# Patient Record
Sex: FEMALE | Race: BLACK OR AFRICAN AMERICAN
[De-identification: names, ages, dates, MRNs, and addresses within clinical notes are randomized per-mention and may not be internally consistent; named-entity substitution may affect disease eponyms.]

---

## 2017-12-01 ENCOUNTER — HOSPITAL ENCOUNTER (EMERGENCY)
Dept: HOSPITAL 74 - JERFT | Age: 52
Discharge: HOME | End: 2017-12-01
Payer: COMMERCIAL

## 2017-12-01 VITALS — SYSTOLIC BLOOD PRESSURE: 152 MMHG | HEART RATE: 61 BPM | TEMPERATURE: 98.3 F | DIASTOLIC BLOOD PRESSURE: 69 MMHG

## 2017-12-01 VITALS — BODY MASS INDEX: 21.6 KG/M2

## 2017-12-01 DIAGNOSIS — F17.210: ICD-10-CM
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
## 2017-12-01 DIAGNOSIS — S99.922A: Primary | ICD-10-CM

## 2017-12-01 NOTE — PDOC
History of Present Illness





- General


Chief Complaint: Injury


Stated Complaint: LT FOOT PAIN


Time Seen by Provider: 12/01/17 10:42


History Source: Patient


Exam Limitations: No Limitations





- History of Present Illness


Initial Comments: 





12/01/17 11:10


CHIEF COMPLAINT: Pain to dorsum of left foot





HISTORY OF PRESENT ILLNESS: Patient is a 52-year-old female, no significant 

medical history currently on no medication presents for left foot pain. Patient 

reports 2 weeks ago she dropped a small safe on her foot since with pain. There 

is no erythema, edema or deformity.








Occurred: reports: other (2 weeks ago)


Severity: Yes: mild


Lower Extremity Pain Location: left: foot





Lower Ext. Injury Location





- Specific Injury Location


Foot: left foot pain





Extremity Pain Location





- Extremity Pain Location


Extremity Pain Locations: left: foot





Past History





- Past Medical History


Allergies/Adverse Reactions: 


 Allergies











Allergy/AdvReac Type Severity Reaction Status Date / Time


 


No Known Allergies Allergy   Verified 12/01/17 10:20











Home Medications: 


Ambulatory Orders





Ibuprofen [Motrin -] 600 mg PO QID #28 tablet 12/01/17 








COPD: No


Other medical history: DENIES.





- Suicide/Smoking/Psychosocial Hx


Smoking History: Current every day smoker


Have you smoked in the past 12 months: Yes


Number of Cigarettes Smoked Daily: 10


Information on smoking cessation initiated: No


Hx Alcohol Use: No





**Review of Systems





- Review of Systems


Constitutional: No: Symptoms Reported


Respiratory: No: Symptoms reported


Cardiac (ROS): No: Symptoms Reported


ABD/GI: No: Symptoms Reported


Musculoskeletal: No: Joint Pain, Joint Swelling, Muscle Pain, Muscle Weakness, 

Joint Stiffness


Integumentary: No: Symptoms Reported, Bruising, Erythema


Neurological: No: Symptoms reported, Paresthesia, Tingling, Tremors


All Other Systems: Reviewed and Negative





*Physical Exam





- Vital Signs


 Last Vital Signs











Temp Pulse Resp BP Pulse Ox


 


 98.3 F   61   19   152/69   98 


 


 12/01/17 10:21  12/01/17 10:21  12/01/17 10:21  12/01/17 10:21  12/01/17 10:21














- Physical Exam


General Appearance: Yes: Appropriately Dressed.  No: Apparent Distress


Neck: negative: Tender lateral, Tender midline


Respiratory/Chest: positive: Lungs Clear, Normal Breath Sounds.  negative: 

Respiratory Distress, Accessory Muscle Use


Cardiovascular: positive: Regular Rhythm, Regular Rate


Musculoskeletal: positive: Normal Inspection.  negative: Decreased Range of 

Motion, Muscle Spasm, Vertebral Tenderness


Extremity: positive: Normal Capillary Refill, Normal Inspection, Normal Range 

of Motion, Tender (to dorsum of the left foot.  ).  negative: Pedal Edema, 

Swelling, Erythema, Inflammation


Integumentary: positive: Normal Color, Dry.  negative: Erythema, Swelling, 

Ecchymosis, Bruising


Neurologic: positive: Alert, Normal Mood/Affect, Normal Response, Motor 

Strength 5/5





ED Treatment Course





- RADIOLOGY


Radiology Studies Ordered: 














 Category Date Time Status


 


 ANKLE & FOOT-LEFT* [RAD] Stat Radiology  12/01/17 10:41 Completed














Medical Decision Making





- Medical Decision Making





12/01/17 11:27


A/P: Patient with injury to foot 2 weeks ago still with pain x-ray demonstrated 

no acute fracture dislocation I have placed patient in Ace wrap, will DC 

patient on Motrin supportive shoes, follow-up with orthopedics if pain persists





I discussed the physical exam findings, ancillary test results and final 

diagnoses with the patient. I answered all of the patient's questions.


The patient was satisfied with the care received and felt comfortable with the 

discharge plan and treatment plan. 


The patient will call  to arrange follow-up and will return to the Emergency 

Department with any new, persistent or worsening symptoms. 





*DC/Admit/Observation/Transfer


Diagnosis at time of Disposition: 


Foot injury


Qualifiers:


 Encounter type: initial encounter Laterality: left Qualified Code(s): S99.922A 

- Unspecified injury of left foot, initial encounter








- Discharge Dispostion


Disposition: HOME


Condition at time of disposition: Good


Admit: No





- Prescriptions


Prescriptions: 


Ibuprofen [Motrin -] 600 mg PO QID #28 tablet





- Referrals


Referrals: 


Brant Oreilly MD [Staff Physician] - 





- Patient Instructions


Additional Instructions: 


Ic e to foot


Motrin for pain


Well fitting shoes.


Follow up with ortho if pain persists.  





- Post Discharge Activity

## 2018-03-27 ENCOUNTER — HOSPITAL ENCOUNTER (EMERGENCY)
Dept: HOSPITAL 74 - JER | Age: 53
Discharge: HOME | End: 2018-03-27
Payer: COMMERCIAL

## 2018-03-27 VITALS — TEMPERATURE: 98.3 F

## 2018-03-27 VITALS — HEART RATE: 72 BPM | SYSTOLIC BLOOD PRESSURE: 118 MMHG | DIASTOLIC BLOOD PRESSURE: 75 MMHG

## 2018-03-27 VITALS — BODY MASS INDEX: 21.4 KG/M2

## 2018-03-27 DIAGNOSIS — F17.210: ICD-10-CM

## 2018-03-27 DIAGNOSIS — F41.9: ICD-10-CM

## 2018-03-27 DIAGNOSIS — R07.89: Primary | ICD-10-CM

## 2018-03-27 LAB
ALBUMIN SERPL-MCNC: 4 G/DL (ref 3.4–5)
ALP SERPL-CCNC: 79 U/L (ref 45–117)
ALT SERPL-CCNC: 20 U/L (ref 12–78)
ANION GAP SERPL CALC-SCNC: 8 MMOL/L (ref 8–16)
APPEARANCE UR: (no result)
AST SERPL-CCNC: 22 U/L (ref 15–37)
BASOPHILS # BLD: 0.8 % (ref 0–2)
BILIRUB SERPL-MCNC: 0.5 MG/DL (ref 0.2–1)
BILIRUB UR STRIP.AUTO-MCNC: NEGATIVE MG/DL
BUN SERPL-MCNC: 15 MG/DL (ref 7–18)
CALCIUM SERPL-MCNC: 9.1 MG/DL (ref 8.5–10.1)
CHLORIDE SERPL-SCNC: 104 MMOL/L (ref 98–107)
CO2 SERPL-SCNC: 30 MMOL/L (ref 21–32)
COLOR UR: (no result)
CREAT SERPL-MCNC: 0.7 MG/DL (ref 0.55–1.02)
DEPRECATED RDW RBC AUTO: 12.9 % (ref 11.6–15.6)
EOSINOPHIL # BLD: 0.2 % (ref 0–4.5)
GLUCOSE SERPL-MCNC: 87 MG/DL (ref 74–106)
HCT VFR BLD CALC: 39.8 % (ref 32.4–45.2)
HGB BLD-MCNC: 13.3 GM/DL (ref 10.7–15.3)
INR BLD: 1.12 (ref 0.82–1.09)
KETONES UR QL STRIP: (no result)
LEUKOCYTE ESTERASE UR QL STRIP.AUTO: NEGATIVE
LYMPHOCYTES # BLD: 40.9 % (ref 8–40)
MAGNESIUM SERPL-MCNC: 2.3 MG/DL (ref 1.8–2.4)
MCH RBC QN AUTO: 30.6 PG (ref 25.7–33.7)
MCHC RBC AUTO-ENTMCNC: 33.4 G/DL (ref 32–36)
MCV RBC: 91.4 FL (ref 80–96)
MONOCYTES # BLD AUTO: 11.7 % (ref 3.8–10.2)
NEUTROPHILS # BLD: 46.4 % (ref 42.8–82.8)
NITRITE UR QL STRIP: NEGATIVE
PH UR: 9 [PH] (ref 5–8)
PLATELET # BLD AUTO: 220 K/MM3 (ref 134–434)
PMV BLD: 8.4 FL (ref 7.5–11.1)
POTASSIUM SERPLBLD-SCNC: 4 MMOL/L (ref 3.5–5.1)
PROT SERPL-MCNC: 7.4 G/DL (ref 6.4–8.2)
PROT UR QL STRIP: NEGATIVE
PROT UR QL STRIP: NEGATIVE
PT PNL PPP: 12.7 SEC (ref 9.98–11.88)
RBC # BLD AUTO: 4.35 M/MM3 (ref 3.6–5.2)
RBC # UR STRIP: NEGATIVE /UL
SODIUM SERPL-SCNC: 142 MMOL/L (ref 136–145)
SP GR UR: 1.01 (ref 1–1.03)
UROBILINOGEN UR STRIP-MCNC: NEGATIVE MG/DL (ref 0.2–1)
WBC # BLD AUTO: 6.7 K/MM3 (ref 4–10)

## 2018-03-27 NOTE — PDOC
History of Present Illness





- General


Chief Complaint: Lightheaded


Stated Complaint: DIZZINESS,HIP PAIN


Time Seen by Provider: 03/27/18 12:08


History Source: Patient


Exam Limitations: No Limitations





- History of Present Illness


Initial Comments: 





03/27/18 13:01


Patient is a 53F with history of tobacco abuse here today complaining of chest 

pain with associated dizziness that started last night. She states that her 

chest pain has resolved now, but her chest pain was worsened with exertion, 

movement of her arms, and palpation. It was relieved with rest. Patient states 

she's had bilateral numbness and weakness in her arms and legs, but she has 

been able to walk. She endorses subjective fevers, chills, nausea. Denies 

vomiting. Denies leg swelling, history of blood clots. Patient complains of 

pain in multiple joints, including her hips legs, shoulders and back. Denies 

trauma.





Past History





- Past Medical History


Allergies/Adverse Reactions: 


 Allergies











Allergy/AdvReac Type Severity Reaction Status Date / Time


 


No Known Allergies Allergy   Verified 03/27/18 12:03











Home Medications: 


Ambulatory Orders





NK [No Known Home Medication]  03/27/18 








COPD: No


Other medical history: DENIES.





- Suicide/Smoking/Psychosocial Hx


Smoking History: Current every day smoker


Have you smoked in the past 12 months: Yes


Number of Cigarettes Smoked Daily: 10


Information on smoking cessation initiated: No


Hx Alcohol Use: No





**Review of Systems





- Review of Systems


Comments:: 





03/27/18 13:06


GENERAL/CONSTITUTIONAL: No fever or chills. No weakness.


HEAD, EYES, EARS, NOSE AND THROAT: No change in vision. No sore throat.


CARDIOVASCULAR: Positive for chest pain and shortness of breath


RESPIRATORY:  Positive for cough. Negative for wheezing, or hemoptysis.


GASTROINTESTINAL: Positive for nausea. Negative for vomiting, diarrhea or 

constipation.


GENITOURINARY: No dysuria, frequency, or change in urination.


MUSCULOSKELETAL: Positive for diffuse joint or muscle swelling or pain. 

Positive for lower back pain.


SKIN: No rash


NEUROLOGIC: No headache, vertigo, loss of consciousness, or change in strength/

sensation.


ENDOCRINE: No increased thirst. No abnormal weight change


HEMATOLOGIC/LYMPHATIC: No anemia, easy bleeding, or history of blood clots.


ALLERGIC/IMMUNOLOGIC: No hives or skin allergy.








*Physical Exam





- Vital Signs


 Last Vital Signs











Temp Pulse Resp BP Pulse Ox


 


 98.3 F   84   20   149/79   99 


 


 03/27/18 11:29  03/27/18 11:29  03/27/18 11:29  03/27/18 11:29  03/27/18 12:00














- Physical Exam


Comments: 





03/27/18 13:10


GENERAL: Awake, alert, and fully oriented, in no acute distress


HEAD: No signs of trauma, normocephalic, atraumatic


EYES: PERRLA, EOMI, sclera anicteric, conjunctiva clear


ENT: Auricles normal inspection, hearing grossly normal, nares patent, 

oropharynx clear without


exudates. Moist mucosa


NECK: Normal ROM, supple, no lymphadenopathy, JVD, or masses


LUNGS: No distress, speaks full sentences, clear to auscultation bilaterally


HEART: Regular rate and rhythm, normal S1 and S2, no murmurs, rubs or gallops, 

peripheral pulses normal and equal bilaterally.


EXTREMITIES: Normal inspection, Normal range of motion, no edema.  No clubbing 

or cyanosis.


NEUROLOGICAL: Cranial nerves II through XII grossly intact.  Normal speech, 

normal gait, no focal sensorimotor deficits


SKIN: Warm, Dry, normal turgor, no rashes or lesions noted.








**Heart Score/ECG Review





- History


History: Slightly suspicious





- Electrocardiogram


EKG: Normal





- Age


Age: 45-65





- Risk Factors


Risk Factors Heart Score: Yes Smoking History


Based on the list above the patient has:: 1-2 risk factors





- Troponin


Troponin: </= normal limit





- Score


Heart Score - Total: 2





ED Treatment Course





- LABORATORY


CBC & Chemistry Diagram: 


 03/27/18 13:25





 03/27/18 13:25





- RADIOLOGY


Radiology Studies Ordered: 














 Category Date Time Status


 


 CHEST PA & LAT [RAD] Stat Radiology  03/27/18 12:34 Ordered














Medical Decision Making





- Medical Decision Making





03/27/18 13:10


Patient is 53F with history of tobacco abuse here today complaining of chest 

pain. Vital signs stable and normal. Chest pain started last night, now 

resolved. Story is atypical. Will evaluate with cbc, cmp, trop, ekg, pt/inr, cxr

, ua. 





EKG shows normal sinus rhythm with normal rate. No st elevations/depressions. 

No significant t wave abnormalities. Normal QRS/WA/QTc intervals.





03/27/18 14:37


 Laboratory Tests











  03/27/18 03/27/18 03/27/18





  13:25 13:25 13:25


 


WBC  6.7  D  


 


Hgb  13.3  


 


Hct  39.8  


 


Plt Count  220  D  


 


INR   1.12 


 


BUN    15


 


Creatinine    0.7


 


Creat Clearance w eGFR    > 60


 


Troponin I    < 0.02








CBC normal, CMP reassuring. INR normal. Troponin undetectable. 


03/27/18 14:56


UA negative. Will discharge with return precautions and PCP follow up.


03/27/18 15:04


Patient now complaining of bilateral leg numbess from hip to knee, but 

ambulatory without pain and has some sensation. Asking for work note. 

Discharged with return precautions.





*DC/Admit/Observation/Transfer


Diagnosis at time of Disposition: 


 Atypical chest pain








- Discharge Dispostion


Disposition: HOME


Condition at time of disposition: Good


Admit: No





- Referrals





- Patient Instructions


Printed Discharge Instructions:  DI for Atypical Chest Pain


Additional Instructions: 


Please return if you have any new, worsening or concerning symptoms.





Please follow up with your primary care physician this week.





- Post Discharge Activity


Forms/Work/School Notes:  Back to Work

## 2018-03-27 NOTE — PDOC
History of Present Illness





- General


History Source: Patient


Exam Limitations: No Limitations





- History of Present Illness


Initial Comments: 





03/27/18 13:32


The patient is a 53 year old female, with a significant past medical history of 

anxiety, who presents to the emergency department with lightheadedness, chest 

pain, dizziness that started last night. She reports her chest pain has mostly 

resolved by ER arrival. She reports her chest pain last night was made worse 

with exertion, movement of her arms, and palpation. Patient states she's had 

bilateral numbness and weakness in her arms and legs, but she has been able to 

walk. She endorses subjective fevers, chills, nausea. Patient complains of pain 

in multiple joints, including her hips legs, shoulders and back. Denies trauma. 

The patient ranks her pain a 7/10 in pain intensity.  She denies recent nausea, 

vomit, diarrhea or constipation. She denies recent  dysuria, frequency, urgency 

or hematuria. She denies recent shortness of breath.





Allergies: NKA 


Past surgical history: None reported.


Social history: Current Smoker. Denies EtOH use and recreational drug use. 








<Eliazar Carrasco - Last Filed: 03/27/18 13:34>





<Ernesto Ayers - Last Filed: 03/27/18 17:55>





- General


Chief Complaint: Lightheaded


Stated Complaint: DIZZINESS,HIP PAIN


Time Seen by Provider: 03/27/18 12:08





Past History





<Eliazar Carrasco - Last Filed: 03/27/18 13:34>





- Past Medical History


COPD: No


Other medical history: DENIES.





- Suicide/Smoking/Psychosocial Hx


Smoking History: Current every day smoker


Have you smoked in the past 12 months: Yes


Number of Cigarettes Smoked Daily: 10


Information on smoking cessation initiated: No


Hx Alcohol Use: No





<Ernesto Ayers - Last Filed: 03/27/18 17:55>





- Past Medical History


Allergies/Adverse Reactions: 


 Allergies











Allergy/AdvReac Type Severity Reaction Status Date / Time


 


No Known Allergies Allergy   Verified 03/27/18 12:03











Home Medications: 


Ambulatory Orders





NK [No Known Home Medication]  03/27/18 











**Review of Systems





- Review of Systems


Able to Perform ROS?: Yes


Comments:: 





03/27/18 13:32


A complete review of 10 out of 10 review of systems is taken and is negative 

apart from what is previously mentioned below and in the HPI.





<Eliazar Carrasco - Last Filed: 03/27/18 13:34>





*Physical Exam





- Vital Signs


 Last Vital Signs











Temp Pulse Resp BP Pulse Ox


 


 98.3 F   84   20   149/79   99 


 


 03/27/18 11:29  03/27/18 11:29  03/27/18 11:29  03/27/18 11:29  03/27/18 12:00














- Physical Exam


Comments: 





03/27/18 13:33


Vitals: Triage Vital signs reviewed


General Appearance:  no acute distress, well nourished well developed,


Head: Atraumatic, normocephalic


Neck:  Supple;No Nuchal rigidity


Chest Wall: Nontender


Cardiac: Regular rate and rhythm, no murmurs, no rubs, no gallops,


Lungs: Clear to auscultation bilateral, good air movement bilaterally,


Abdomen:  Soft, nondistended, normal bowel sounds, nontender to palpation


Extremities: Full range of motion to all extremities, no cyanosis, clubbing, or 

edema


Skin:  Warm and dry, no rashes or lesions, no petechiae


Neuro: AOX3; Cranial Nerves 2-12 grossly c intact, Strength intact to all 

extremities, Sensation intact to all extremities, gait normal


Psych:  normal mood, normal affect











<Eliazar Carrasco - Last Filed: 03/27/18 13:34>





- Vital Signs


 Last Vital Signs











Temp Pulse Resp BP Pulse Ox


 


 98.3 F   84   20   149/79   99 


 


 03/27/18 11:29  03/27/18 11:29  03/27/18 11:29  03/27/18 11:29  03/27/18 12:00














<Ernesto Ayers - Last Filed: 03/27/18 17:55>





ED Treatment Course





- LABORATORY


CBC & Chemistry Diagram: 


 03/27/18 13:25





 03/27/18 13:25





<Eliazar Carrasco - Last Filed: 03/27/18 13:34>





- LABORATORY


CBC & Chemistry Diagram: 


 03/27/18 13:25





 03/27/18 13:25





<Ernesto Ayers - Last Filed: 03/27/18 17:55>





Medical Decision Making





- Medical Decision Making





03/27/18 13:33


The patient is a 53 year old female, with a significant past medical history of 

anxiety, who presents to the emergency department with lightheadedness, chest 

pain, dizziness that started last night. She reports her chest pain has mostly 

resolved by ER arrival. 





Plan:


1.CBC 


2.Cardiac profile 


3.PT/INR


4.UA 


5.EKG 


6.Chest X-Ray








<Eliazar Carrasco - Last Filed: 03/27/18 13:34>





- Medical Decision Making








Atypical chest discomfort heart score 3 nonischemic EKG troponin negative times 

one given that pain was yesterday her risk of MACE is less than 1.6%





Patient advised follow-up with her primary care provider in 1-2 days she has a 

nonfocal neurologic examination





Findings, need follow-up, strict return instructions discussed patient.








Pt. seen with resident Dr. Gonzalez








<Ernesto Ayers - Last Filed: 03/27/18 17:55>





*DC/Admit/Observation/Transfer





- Attestations


Scribe Attestion: 





03/27/18 13:33





Documentation prepared by Eliazar Carrasco, acting as medical scribe for Ernesto Ayers MD.








<Eliazar Carrasco - Last Filed: 03/27/18 13:34>





<Ernesto Ayers - Last Filed: 03/27/18 17:55>


Diagnosis at time of Disposition: 


 Atypical chest pain








- Discharge Dispostion


Disposition: HOME


Condition at time of disposition: Good





- Patient Instructions


Printed Discharge Instructions:  DI for Atypical Chest Pain


Additional Instructions: 


Please return if you have any new, worsening or concerning symptoms.





Please follow up with your primary care physician this week.





- Post Discharge Activity


Forms/Work/School Notes:  Back to Work

## 2018-03-27 NOTE — EKG
Test Reason : 

Blood Pressure : ***/*** mmHG

Vent. Rate : 071 BPM     Atrial Rate : 071 BPM

   P-R Int : 134 ms          QRS Dur : 084 ms

    QT Int : 368 ms       P-R-T Axes : 075 051 029 degrees

   QTc Int : 399 ms

 

NORMAL SINUS RHYTHM

POSSIBLE LEFT ATRIAL ENLARGEMENT

LEFT VENTRICULAR HYPERTROPHY

ABNORMAL ECG

WHEN COMPARED WITH ECG OF 28-SEP-2014 11:20,

NO SIGNIFICANT CHANGE WAS FOUND

Confirmed by MD Ivon, Steven ([id number]) on 3/27/2018 4:53:35 PM

 

Referred By:             Confirmed By:Steven Del Valle MD

## 2020-12-08 ENCOUNTER — HOSPITAL ENCOUNTER (EMERGENCY)
Dept: HOSPITAL 74 - JERFT | Age: 55
Discharge: HOME | End: 2020-12-08
Payer: COMMERCIAL

## 2020-12-08 VITALS — HEART RATE: 63 BPM | SYSTOLIC BLOOD PRESSURE: 141 MMHG | DIASTOLIC BLOOD PRESSURE: 75 MMHG

## 2020-12-08 VITALS — BODY MASS INDEX: 19.8 KG/M2

## 2020-12-08 DIAGNOSIS — S83.91XA: Primary | ICD-10-CM

## 2021-08-26 ENCOUNTER — HOSPITAL ENCOUNTER (EMERGENCY)
Dept: HOSPITAL 74 - JER | Age: 56
LOS: 1 days | Discharge: HOME | End: 2021-08-27
Payer: COMMERCIAL

## 2021-08-26 VITALS — BODY MASS INDEX: 20.9 KG/M2

## 2021-08-26 VITALS — TEMPERATURE: 98.5 F

## 2021-08-26 DIAGNOSIS — R42: Primary | ICD-10-CM

## 2021-08-26 LAB
ALBUMIN SERPL-MCNC: 3.8 G/DL (ref 3.4–5)
ALP SERPL-CCNC: 72 U/L (ref 45–117)
ALT SERPL-CCNC: 23 U/L (ref 13–61)
ANION GAP SERPL CALC-SCNC: 5 MMOL/L (ref 8–16)
AST SERPL-CCNC: 18 U/L (ref 15–37)
BASOPHILS # BLD: 1 % (ref 0–2)
BILIRUB SERPL-MCNC: 0.4 MG/DL (ref 0.2–1)
BUN SERPL-MCNC: 14.3 MG/DL (ref 7–18)
CALCIUM SERPL-MCNC: 9.3 MG/DL (ref 8.5–10.1)
CHLORIDE SERPL-SCNC: 108 MMOL/L (ref 98–107)
CO2 SERPL-SCNC: 31 MMOL/L (ref 21–32)
CREAT SERPL-MCNC: 0.9 MG/DL (ref 0.55–1.3)
DEPRECATED RDW RBC AUTO: 13 % (ref 11.6–15.6)
EOSINOPHIL # BLD: 0.9 % (ref 0–4.5)
GLUCOSE SERPL-MCNC: 82 MG/DL (ref 74–106)
HCT VFR BLD CALC: 40 % (ref 32.4–45.2)
HGB BLD-MCNC: 13.5 GM/DL (ref 10.7–15.3)
LYMPHOCYTES # BLD: 47.5 % (ref 8–40)
MCH RBC QN AUTO: 30.4 PG (ref 25.7–33.7)
MCHC RBC AUTO-ENTMCNC: 33.7 G/DL (ref 32–36)
MCV RBC: 90.4 FL (ref 80–96)
MONOCYTES # BLD AUTO: 11.4 % (ref 3.8–10.2)
NEUTROPHILS # BLD: 39.2 % (ref 42.8–82.8)
PLATELET # BLD AUTO: 215 10^3/UL (ref 134–434)
PMV BLD: 8.1 FL (ref 7.5–11.1)
PROT SERPL-MCNC: 7.5 G/DL (ref 6.4–8.2)
RBC # BLD AUTO: 4.42 M/MM3 (ref 3.6–5.2)
SODIUM SERPL-SCNC: 143 MMOL/L (ref 136–145)
WBC # BLD AUTO: 6.4 K/MM3 (ref 4–10)

## 2021-08-26 PROCEDURE — 3E033NZ INTRODUCTION OF ANALGESICS, HYPNOTICS, SEDATIVES INTO PERIPHERAL VEIN, PERCUTANEOUS APPROACH: ICD-10-PCS

## 2021-08-27 VITALS — HEART RATE: 51 BPM | SYSTOLIC BLOOD PRESSURE: 120 MMHG | DIASTOLIC BLOOD PRESSURE: 72 MMHG

## 2022-08-08 ENCOUNTER — HOSPITAL ENCOUNTER (EMERGENCY)
Dept: HOSPITAL 74 - JER | Age: 57
Discharge: HOME | End: 2022-08-08
Payer: SELF-PAY

## 2022-08-08 VITALS — BODY MASS INDEX: 22 KG/M2

## 2022-08-08 VITALS
RESPIRATION RATE: 16 BRPM | HEART RATE: 61 BPM | SYSTOLIC BLOOD PRESSURE: 161 MMHG | TEMPERATURE: 97.6 F | DIASTOLIC BLOOD PRESSURE: 74 MMHG

## 2022-08-08 DIAGNOSIS — N12: Primary | ICD-10-CM

## 2022-08-08 LAB
ALBUMIN SERPL-MCNC: 3.9 G/DL (ref 3.4–5)
ALP SERPL-CCNC: 77 U/L (ref 45–117)
ALT SERPL-CCNC: 19 U/L (ref 13–61)
ANION GAP SERPL CALC-SCNC: 5 MMOL/L (ref 8–16)
APPEARANCE UR: (no result)
AST SERPL-CCNC: 22 U/L (ref 15–37)
BACTERIA # UR AUTO: 6308 /UL (ref 0–1359)
BASOPHILS # BLD: 0.8 % (ref 0–2)
BILIRUB SERPL-MCNC: 0.4 MG/DL (ref 0.2–1)
BILIRUB UR STRIP.AUTO-MCNC: (no result) MG/DL
BUN SERPL-MCNC: 13.3 MG/DL (ref 7–18)
CALCIUM SERPL-MCNC: 9.1 MG/DL (ref 8.5–10.1)
CASTS URNS QL MICRO: 4 /UL (ref 0–3.1)
CHLORIDE SERPL-SCNC: 109 MMOL/L (ref 98–107)
CO2 SERPL-SCNC: 30 MMOL/L (ref 21–32)
COLOR UR: (no result)
CREAT SERPL-MCNC: 0.7 MG/DL (ref 0.55–1.3)
CRYSTALS URNS QL MICRO: (no result) /HPF
DEPRECATED RDW RBC AUTO: 13.1 % (ref 11.6–15.6)
EOSINOPHIL # BLD: 0.7 % (ref 0–4.5)
EPITH CASTS URNS QL MICRO: 31 /UL (ref 0–25.1)
GLUCOSE SERPL-MCNC: 86 MG/DL (ref 74–106)
HCT VFR BLD CALC: 39.1 % (ref 32.4–45.2)
HGB BLD-MCNC: 13 GM/DL (ref 10.7–15.3)
KETONES UR QL STRIP: (no result)
LEUKOCYTE ESTERASE UR QL STRIP.AUTO: (no result)
LYMPHOCYTES # BLD: 45.1 % (ref 8–40)
MAGNESIUM SERPL-MCNC: 2 MG/DL (ref 1.8–2.4)
MCH RBC QN AUTO: 30.1 PG (ref 25.7–33.7)
MCHC RBC AUTO-ENTMCNC: 33.2 G/DL (ref 32–36)
MCV RBC: 90.6 FL (ref 80–96)
MONOCYTES # BLD AUTO: 9.1 % (ref 3.8–10.2)
NEUTROPHILS # BLD: 44.3 % (ref 42.8–82.8)
NITRITE UR QL STRIP: POSITIVE
PH UR: 5.5 [PH] (ref 5–8)
PLATELET # BLD AUTO: 223 10^3/UL (ref 134–434)
PMV BLD: 9 FL (ref 7.5–11.1)
PROT SERPL-MCNC: 7.4 G/DL (ref 6.4–8.2)
PROT UR QL STRIP: NEGATIVE
PROT UR QL STRIP: NEGATIVE
RBC # BLD AUTO: 4.31 M/MM3 (ref 3.6–5.2)
RBC # BLD AUTO: 55.7 /UL (ref 0–23.9)
SODIUM SERPL-SCNC: 143 MMOL/L (ref 136–145)
SP GR UR: 1.02 (ref 1.01–1.03)
UROBILINOGEN UR STRIP-MCNC: 1 MG/DL (ref 0.2–1)
WBC # BLD AUTO: 7.3 K/MM3 (ref 4–10)
WBC # UR AUTO: 122 /UL (ref 0–25.8)